# Patient Record
Sex: FEMALE | Race: ASIAN | URBAN - METROPOLITAN AREA
[De-identification: names, ages, dates, MRNs, and addresses within clinical notes are randomized per-mention and may not be internally consistent; named-entity substitution may affect disease eponyms.]

---

## 2017-02-19 ENCOUNTER — OFFICE VISIT (OUTPATIENT)
Dept: URGENT CARE | Facility: URGENT CARE | Age: 42
End: 2017-02-19
Payer: COMMERCIAL

## 2017-02-19 VITALS
DIASTOLIC BLOOD PRESSURE: 70 MMHG | WEIGHT: 98.4 LBS | SYSTOLIC BLOOD PRESSURE: 120 MMHG | BODY MASS INDEX: 19.32 KG/M2 | OXYGEN SATURATION: 100 % | HEART RATE: 67 BPM | HEIGHT: 60 IN | TEMPERATURE: 98 F

## 2017-02-19 DIAGNOSIS — H81.12 BPPV (BENIGN PAROXYSMAL POSITIONAL VERTIGO), LEFT: Primary | ICD-10-CM

## 2017-02-19 PROCEDURE — 99213 OFFICE O/P EST LOW 20 MIN: CPT | Performed by: PHYSICIAN ASSISTANT

## 2017-02-19 RX ORDER — MECLIZINE HYDROCHLORIDE 25 MG/1
25 TABLET ORAL 4 TIMES DAILY PRN
Qty: 30 TABLET | Refills: 1 | Status: SHIPPED | OUTPATIENT
Start: 2017-02-19 | End: 2018-02-05

## 2017-02-19 NOTE — MR AVS SNAPSHOT
After Visit Summary   2/19/2017    Rome Valdovinos    MRN: [id number]           Patient Information     Date Of Birth          1975        Visit Information        Provider Department      2/19/2017 9:45 AM Nasrin Davis PA-C [facility]        Today's Diagnoses     BPPV (benign paroxysmal positional vertigo), left    -  1      Care Instructions      Chóng M?t [Vertigo, Unknown Cause]  Rain phillip ?? ?c tìm th?y phillip não phía leonela màng nh? và rain gi?a. Nó là m?t ph?n c? a grover tâm th?ng b ?ng. B?nh c?a rain phillip gây nên s? chóng m?t - m?t c?m giác griselda l?c v? donna?n ? ?ng. C? m giác nh? là qu ý v? ho? c c?n ph òng ? ang mihaela cu?ng. M? t c?n chóng m ?t có th? khi?n bu?n nôn ? ?t ng?t, ói m?a, ho?c ra mitesh?u m? hôi. S? chóng m?t tr?m tr?ng gây vi?c m? t th?ng b ?ng và có th? làm cho quý v? té ngã. Khi b? chóng m?t, nh?ng c? ? ?ng nh? c? a ? ?u và nh? ng thay ? ? i phillip t? th ? c? a c? th ? th? ?ng s? làm cho các tri?u ch?ng t? h?n .    Nh?ng nguyên nhân c?a ch?ng chóng m?t terri g?m:    Viêm rain phillip    B?nh c?a dây th? n kinh ? ?n rain phillip    Di ? ?ng c?a các h?t can xi (calcium) ? rain phillip    Máu ch?y ? ? n các grover tâm th?ng b ?ng c?a não không ? ? y ? ?  M? t c?n chóng m ?t có th? kéo dài vài giây, vài phút ho?c vài gi?. M?t khi quý v? augusta kh? i c?n chóng m ?t l? n ? ?u, có th? nó không terri gi? tr? l?i n?a. Tian nhiên, ?ôi lúc các tri ?u ch?ng có th? tái phát phillip mitesh?u tu?n ho? c lâu h?n , tùy vào nguyên nhân. Có th? b? ù rain ho?c m?t thính l?c, có th? là t?m th?i ho?c v?nh vi?n.  Ch?m Sóc T?i Elisa:  1) N?u các tri?u ch?ng tr?m tr?ng, hãy ngh? ng?i trên gi? ?ng m?t cách yên t?nh. Thay ? ? i t? th ? t? t?. Th? ?ng s? có m? t t? th ? mà quý v? c?m th?y t?t nh? t, nh? n ?m nghiêng ho?c n?m th? ng l?ng , ? ?u h ? i nâng cortez b?ng g?i.  2) ? ?ng lái xe ho?c ?i ?u khi?n máy móc nguy hi?m phillip vòng m?t tu?n l? leonela khi không còn các tri?u ch?ng, l ? các tri?u ch?ng mihaela  l? i ? ?t ng?t.  3) Dùng thu? c nh? ? ã ?? ?c ch? ? ? nh ? ? gi?m các tri?u ch?ng c?a quý v?. Tr? khi m?t lo?i thu? c khác ? ã ?? ?c ch? ? ? nh ? ? tr? bu?n nôn, ói m?a và chóng m?t, còn không thì quý v? có th? dùng thu?c viên tr? b?nh donna? n ? ?ng bán không c?n toa , nh? meclizine (Bonine, Bonamine, Antivert) ho?c dimenhydrinate (Dramamine).  Ti?p T?c Alden Dõi  v?i bác s? c?a quý v? ho? c alden h? ?ng d?n c?a nhân viên chúng tôi. Cho bác s? bi?t n?u rain quý v? v?n còn ù ho?c n?u thính l?c c?a quý v? không tr? l?i bình th ? ?ng.  N?u x?y ra b?t c? ?i?u nào leonela ?ây hãy L?P T?C ?I?U TR? Y T?:  -- Ch?ng chóng m?t t? h?n và thu ?c ? ã ch? ? ?nh không ki? m soát ?? ?c  -- Ói m?a mitesh?u l?n không gi?m leonela khi dùng thu? c ? ã ch? ? ?nh  -- Vira y?u ho?c ng?t x?u araceli t?ng  -- Nh? c ? ?u tr?m tr?ng ho?c bu?n ng? hay b?i r?i b? t th? ?ng  -- Y?u m?t cánh carley ho?c m?t c?ng chân hay m?t bên m?t  -- Nói ho?c nhìn khó kh ?n    8163-9980 [employer]. [address] [place] [address], [place] [address]. All rights reserved. This information is not intended as a substitute for professional medical care. Always follow your healthcare professional's instructions.              Follow-ups after your visit        Who to contact     If you have questions or need follow up information about today's clinic visit or your schedule please contact [facility] directly at [phone].  Normal or non-critical lab and imaging results will be communicated to you by MyChart, letter or phone within 4 business days after the clinic has received the results. If you do not hear from us within 7 days, please contact the clinic through [employer] or phone. If you have a critical or abnormal lab result, we will notify you by phone as soon as possible.  Submit refill requests through [employer] or call your pharmacy and they will forward the refill request to us. Please allow 3 business days for your refill to be completed.           Additional Information About Your Visit        [employer] Information     [employer] gives you secure access to your electronic health record. If you see a primary care provider, you can also send messages to your care team and make appointments. If you have questions, please call your primary care clinic.  If you do not have a primary care provider, please call [phone] and they will assist you.        Care EveryWhere ID     This is your Care EveryWhere ID. This could be used by other organizations to access your [facility] medical records  [id number]        Your Vitals Were     Pulse Temperature Height Pulse Oximetry BMI (Body Mass Index)       67 98  F (36.7  C) 5' (1.524 m) 100% 19.22 kg/m2        Blood Pressure from Last 3 Encounters:   02/19/17 120/70   08/04/16 100/60   05/09/16 100/64    Weight from Last 3 Encounters:   02/19/17 98 lb 6.4 oz (44.6 kg)   08/04/16 97 lb 9.6 oz (44.3 kg)   05/09/16 96 lb 6.4 oz (43.7 kg)              Today, you had the following     No orders found for display         Today's Medication Changes          These changes are accurate as of: 2/19/17 11:04 AM.  If you have any questions, ask your nurse or doctor.               Start taking these medicines.        Dose/Directions    meclizine 25 MG tablet   Commonly known as:  ANTIVERT   Used for:  BPPV (benign paroxysmal positional vertigo), left   Started by:  Nasrin Davis PA-C        Dose:  25 mg   Take 1 tablet (25 mg) by mouth 4 times daily as needed for dizziness   Quantity:  30 tablet   Refills:  1            Where to get your medicines      These medications were sent to [id number] - [place], [place] - [address] AT [facility] [address] [place] [address]  [address], [place] [address]    Hours:  24-hours Phone:  [phone]     meclizine 25 MG tablet                Primary Care Provider Office Phone # Fax #    Capri Matt [phone]       [place] [address]  [place] [address]        Thank you!     Thank you for choosing [facility]  for your care. Our goal is always to provide you with excellent care. Hearing back from our patients is one way we can continue to improve our services. Please take a few minutes to complete the written survey that you may receive in the mail after your visit with us. Thank you!             Your Updated Medication List - Protect others around you: Learn how to safely use, store and throw away your medicines at www.disposemymeds.org.          This list is accurate as of: 2/19/17 11:04 AM.  Always use your most recent med list.                   Brand Name Dispense Instructions for use    cyclobenzaprine 5 MG tablet    FLEXERIL    15 tablet    Take 1 tablet (5 mg) by mouth At Bedtime       FIRST-MOUTHWASH BLM Susp     237 mL    Swish and swallow 5-10 mLs in mouth every 6 hours as needed       meclizine 25 MG tablet    ANTIVERT    30 tablet    Take 1 tablet (25 mg) by mouth 4 times daily as needed for dizziness       naproxen 500 MG tablet    NAPROSYN    30 tablet    Take 1 tablet (500 mg) by mouth 2 times daily as needed for head ache       SUMAtriptan 50 MG tablet    IMITREX    9 tablet    Take 1 tablet (50 mg) by mouth at onset of headache for migraine May repeat dose in 2 hours.  Do not exceed 200 mg in 24 hours

## 2017-02-19 NOTE — PATIENT INSTRUCTIONS
Chóng M?t [Vertigo, Unknown Cause]  Rain phillip ?? ?c tìm th?y phillip não phía leonela màng nh? và rain gi?a. Nó là m?t ph?n c? a grover tâm th?ng b ?ng. B?nh c?a rain phillip gây nên s? chóng m?t - m?t c?m giác griselda l?c v? donna?n ? ?ng. C? m giác nh? là qu ý v? ho? c c?n ph òng ? ang mihaela cu?ng. M? t c?n chóng m ?t có th? khi?n bu?n nôn ? ?t ng?t, ói m?a, ho?c ra mitesh?u m? hôi. S? chóng m?t tr?m tr?ng gây vi?c m? t th?ng b ?ng và có th? làm cho quý v? té ngã. Khi b? chóng m?t, nh?ng c? ? ?ng nh? c? a ? ?u và nh? ng thay ? ? i phillip t? th ? c? a c? th ? th? ?ng s? làm cho các tri?u ch?ng t? h?n .    Nh?ng nguyên nhân c?a ch?ng chóng m?t terri g?m:    Viêm rain phillip    B?nh c?a dây th? n kinh ? ?n rain phillip    Di ? ?ng c?a các h?t can xi (calcium) ? rain phillip    Máu ch?y ? ? n các grover tâm th?ng b ?ng c?a não không ? ? y ? ?  M? t c?n chóng m ?t có th? kéo dài vài giây, vài phút ho?c vài gi?. M?t khi quý v? augusta kh? i c?n chóng m ?t l? n ? ?u, có th? nó không terri gi? tr? l?i n?a. Tian nhiên, ?ôi lúc các tri ?u ch?ng có th? tái phát phillip mitesh?u tu?n ho? c lâu h?n , tùy vào nguyên nhân. Có th? b? ù rain ho?c m?t thính l?c, có th? là t?m th?i ho?c v?nh vi?n.  Ch?m Sóc T?i Elisa:  1) N?u các tri?u ch?ng tr?m tr?ng, hãy ngh? ng?i trên gi? ?ng m?t cách yên t?nh. Thay ? ? i t? th ? t? t?. Th? ?ng s? có m? t t? th ? mà quý v? c?m th?y t?t nh? t, nh? n ?m nghiêng ho?c n?m th? ng l?ng , ? ?u h ? i nâng cortez b?ng g?i.  2) ? ?ng lái xe ho?c ?i ?u khi?n máy móc nguy hi?m phillip vòng m?t tu?n l? leonela khi không còn các tri?u ch?ng, l ? các tri?u ch?ng mihaeal l? i ? ?t ng?t.  3) Dùng thu? c nh? ? ã ?? ?c ch? ? ? nh ? ? gi?m các tri?u ch?ng c?a quý v?. Tr? khi m?t lo?i thu? c khác ? ã ?? ?c ch? ? ? nh ? ? tr? bu?n nôn, ói m?a và chóng m?t, còn không thì quý v? có th? dùng thu?c viên tr? b?nh donna? n ? ?ng bán không c?n toa , nh? meclizine (Bonine, Bonamine, Antivert) ho?c dimenhydrinate (Dramamine).  Ti?p T?c Alden Dõi  v?i bác s? c?a quý v? ho? c alden h?  ?ng d?n c?a nhân viên chúng tôi. Cho bác s? bi?t n?u rain quý v? v?n còn ù ho?c n?u thính l?c c?a quý v? không tr? l?i bình th ? ?ng.  N?u x?y ra b?t c? ?i?u nào leonela ?ây hãy L?P T?C ?I?U TR? Y T?:  -- Ch?ng chóng m?t t? h?n và thu ?c ? ã ch? ? ?nh không ki? m soát ?? ?c  -- Ói m?a mitesh?u l?n không gi?m leonela khi dùng thu? c ? ã ch? ? ?nh  -- Vira y?u ho?c ng?t x?u araceli t?ng  -- Nh? c ? ?u tr?m tr?ng ho?c bu?n ng? hay b?i r?i b? t th? ?ng  -- Y?u m?t cánh carley ho?c m?t c?ng chân hay m?t bên m?t  -- Nói ho?c nhìn khó kh ?n    [id number] [employer]. [address] [place] [address], [place] [address]. All rights reserved. This information is not intended as a substitute for professional medical care. Always follow your healthcare professional's instructions.

## 2017-02-20 NOTE — PROGRESS NOTES
CHIEF COMPLAINT:   Chief Complaint   Patient presents with     Dizziness     Pt reports dizziness with onset this morning. Pt denies pain.     Urgent Care       HPI: Rome Valdovinos is a 41 year old female who presents to clinic today for evaluation of dizziness. When she woke up this AM and turned over as she was getting out of bed, she started to get dizzy and as she stood up she had trouble keeping her balance. She descibes the dizziness as the room spinning around her and denies having and sense of dizziness or weakness. She admits to a small amount of nausea, but denies lightheadedness or weakness. She states that it is worse when she moves her head to left. She denies recent head trauma, having fallen or passed out, difficulty speaking, CP, palpitations, recent cold, staggering gait, hearing loss, double vision, or heavy bleeding. She has not taken any medication for these symptoms.   Past Medical History   Diagnosis Date     NO ACTIVE PROBLEMS      Past Surgical History   Procedure Laterality Date     C/section, low transverse       x 2     C ligation,fallopian tube w/       with last C section     Social History   Substance Use Topics     Smoking status: Never Smoker     Smokeless tobacco: Never Used     Alcohol use 0.0 oz/week     0 Standard drinks or equivalent per week      Comment: little bit of beer sometimes     Current Outpatient Prescriptions   Medication     meclizine (ANTIVERT) 25 MG tablet     SUMAtriptan (IMITREX) 50 MG tablet     DPH-Lido-AlHydr-MgHydr-Simeth (FIRST-MOUTHWASH BLM) SUSP     cyclobenzaprine (FLEXERIL) 5 MG tablet     naproxen (NAPROSYN) 500 MG tablet     No current facility-administered medications for this visit.      No Known Allergies    ROS:  C: NEGATIVE for fever, chills, change in weight  INTEGUMENTARY/SKIN: NEGATIVE for worrisome rashes, moles or lesions  E/M: NEGATIVE for sore throat, ear or sinus problems  R: NEGATIVE for cough  CV: NEGATIVE for chest pain,  palpitations or peripheral edema  GI: POSITIVE for nausea  MUSCULOSKELETAL: NEGATIVE for significant arthralgias or myalgia  NEURO: POSITIVE for dizziness  ENDOCRINE: NEGATIVE for cold intolerance, HX diabetes and HX thyroid disease  HEME/ALLERGY/IMMUNE: NEGATIVE for swollen nodes      Exam:  /70  Pulse 67  Temp 98  F (36.7  C)  Ht 5' (1.524 m)  Wt 98 lb 6.4 oz (44.6 kg)  SpO2 100%  BMI 19.22 kg/m2  Physical Exam   Constitutional: She is well-developed, well-nourished, and in no distress. Vital signs are normal. She appears not dehydrated.  Non-toxic appearance. She does not have a sickly appearance. No distress.   HENT:   Head: Normocephalic and atraumatic.   Right Ear: Tympanic membrane and ear canal normal. Tympanic membrane is not erythematous and not bulging.   Left Ear: External ear and ear canal normal. Tympanic membrane is not erythematous and not bulging.   Nose: Nose normal. Right sinus exhibits no maxillary sinus tenderness and no frontal sinus tenderness. Left sinus exhibits no maxillary sinus tenderness and no frontal sinus tenderness.   Mouth/Throat: Uvula is midline, oropharynx is clear and moist and mucous membranes are normal. No oropharyngeal exudate.   Eyes: Conjunctivae are normal. Pupils are equal, round, and reactive to light. Right eye exhibits normal extraocular motion and no nystagmus. Left eye exhibits normal extraocular motion and no nystagmus.   Neck: Trachea normal.   Cardiovascular: Normal rate and regular rhythm.    Pulmonary/Chest: Effort normal and breath sounds normal.   Neurological: She is alert. She has normal motor skills and intact cranial nerves. No cranial nerve deficit. Gait normal. GCS score is 15.   Dizziness worsened turning head to left   Skin: Skin is warm, dry and intact. She is not diaphoretic.         ASSESSMENT/PLAN:  1. BPPV (benign paroxysmal positional vertigo), left  For worsening or continuing symptoms follow up with pcp or RTC.  - meclizine (ANTIVERT) 25  MG tablet; Take 1 tablet (25 mg) by mouth 4 times daily as needed for dizziness  Dispense: 30 tablet; Refill: 1    I have discussed any lab or imaging results, the patient's diagnosis, and my plan of treatment with the patient and/or family. Patient is aware to come back in with worsening symptoms or if no relief despite treatment plan.  Patient voiced understanding and had no further questions.   Nasrin Davis PA-C

## 2017-05-10 ENCOUNTER — OFFICE VISIT (OUTPATIENT)
Dept: FAMILY MEDICINE | Facility: CLINIC | Age: 42
End: 2017-05-10
Payer: COMMERCIAL

## 2017-05-10 VITALS
SYSTOLIC BLOOD PRESSURE: 108 MMHG | OXYGEN SATURATION: 100 % | HEIGHT: 60 IN | WEIGHT: 101 LBS | TEMPERATURE: 98.4 F | DIASTOLIC BLOOD PRESSURE: 74 MMHG | HEART RATE: 76 BPM | BODY MASS INDEX: 19.83 KG/M2

## 2017-05-10 DIAGNOSIS — K13.79 SORE MOUTH: ICD-10-CM

## 2017-05-10 DIAGNOSIS — D64.9 LOW HEMOGLOBIN: ICD-10-CM

## 2017-05-10 DIAGNOSIS — G43.009 NONINTRACTABLE MIGRAINE, UNSPECIFIED MIGRAINE TYPE: ICD-10-CM

## 2017-05-10 DIAGNOSIS — Z00.00 ROUTINE GENERAL MEDICAL EXAMINATION AT A HEALTH CARE FACILITY: Primary | ICD-10-CM

## 2017-05-10 DIAGNOSIS — Z91.018 ALLERGY TO FOOD: ICD-10-CM

## 2017-05-10 LAB
ERYTHROCYTE [DISTWIDTH] IN BLOOD BY AUTOMATED COUNT: 11.2 % (ref 10–15)
HCT VFR BLD AUTO: 36.9 % (ref 35–47)
HGB BLD-MCNC: 12.4 G/DL (ref 11.7–15.7)
MCH RBC QN AUTO: 33.5 PG (ref 26.5–33)
MCHC RBC AUTO-ENTMCNC: 33.6 G/DL (ref 31.5–36.5)
MCV RBC AUTO: 100 FL (ref 78–100)
PLATELET # BLD AUTO: 208 10E9/L (ref 150–450)
RBC # BLD AUTO: 3.7 10E12/L (ref 3.8–5.2)
WBC # BLD AUTO: 6.5 10E9/L (ref 4–11)

## 2017-05-10 PROCEDURE — 99213 OFFICE O/P EST LOW 20 MIN: CPT | Mod: 25 | Performed by: FAMILY MEDICINE

## 2017-05-10 PROCEDURE — 86003 ALLG SPEC IGE CRUDE XTRC EA: CPT | Performed by: FAMILY MEDICINE

## 2017-05-10 PROCEDURE — 85027 COMPLETE CBC AUTOMATED: CPT | Performed by: FAMILY MEDICINE

## 2017-05-10 PROCEDURE — 99396 PREV VISIT EST AGE 40-64: CPT | Performed by: FAMILY MEDICINE

## 2017-05-10 PROCEDURE — 36415 COLL VENOUS BLD VENIPUNCTURE: CPT | Performed by: FAMILY MEDICINE

## 2017-05-10 RX ORDER — SUMATRIPTAN 50 MG/1
50 TABLET, FILM COATED ORAL
Qty: 9 TABLET | Refills: 1 | Status: SHIPPED | OUTPATIENT
Start: 2017-05-10

## 2017-05-10 NOTE — NURSING NOTE
Chief Complaint   Patient presents with     Physical       Initial /74  Pulse 76  Temp 98.4  F (36.9  C) (Tympanic)  Ht 5' (1.524 m)  Wt 101 lb (45.8 kg)  LMP 04/17/2017  SpO2 100%  BMI 19.73 kg/m2 Estimated body mass index is 19.73 kg/(m^2) as calculated from the following:    Height as of this encounter: 5' (1.524 m).    Weight as of this encounter: 101 lb (45.8 kg).  Medication Reconciliation: complete

## 2017-05-10 NOTE — PROGRESS NOTES
SUBJECTIVE:     CC: Rome Valdovinos is an 42 year old woman who presents for preventive health visit.     Healthy Habits:    Do you get at least three servings of calcium containing foods daily (dairy, green leafy vegetables, etc.)? yes    Amount of exercise or daily activities, outside of work: 5 days at work  physical demanding duties day(s) per week    Problems taking medications regularly No    Medication side effects: No    Have you had an eye exam in the past two years? yes    Do you see a dentist twice per year? yes    Do you have sleep apnea, excessive snoring or daytime drowsiness?no        PROBLEMS TO ADD ON...  Has problem with recurrent sore in her mouth. She has sore  spots on both sides of mouth along the gums mostly . so she is just concerned . It feels uncomfortable to eat sometimes with hot or spicy food but not all the time. She saw her dentist few weeks ago and she was told her gums were slightly inflamed and may be tight. She just tried taking some antibiotic last week  that she had  from back home,  and did  help some . She is scheduled to see her dentist again tomorrow as well for follow up    She also states that she has possible food allergy and sometimes she gets hives from some food unsure what but she thinks it is possible sea food although she thinks it is not too bad, so she does not eat as much sea food now but still on and off.  she unsure if her mouth sore have  any co relation to food allergies or not    she also had low Hgb, but never came back to do follow up test, so wish to get labs.     Headaches follow up       Duration: has hx of migraine but doing well on current med's     Description  Location: bilateral in the frontal area,    Character: throbbing pain  Frequency:  Once a month, related to menstrual cycle , Imitrex works well       Intensity:  mild, moderate    Accompanying signs and symptoms:    Precipitating or Alleviating factors:  Nausea/vomiting: not any more, after  she takes med's,  HA goes away and she does not feel as sick now,   Family history of migraines: YES, sister            Today's PHQ-2 Score:   PHQ-2 ( 1999 Pfizer) 5/10/2017 8/4/2016   Q1: Little interest or pleasure in doing things 0 0   Q2: Feeling down, depressed or hopeless 0 0   PHQ-2 Score 0 0       Abuse: Current or Past(Physical, Sexual or Emotional)- No  Do you feel safe in your environment - Yes    Social History   Substance Use Topics     Smoking status: Never Smoker     Smokeless tobacco: Never Used     Alcohol use 0.0 oz/week     0 Standard drinks or equivalent per week      Comment: little bit of beer sometimes     The patient does not drink >3 drinks per day nor >7 drinks per week.    Recent Labs   Lab Test  05/09/16   0757  12/13/13   0923   CHOL  223*  259*   HDL  61  50   LDL  149*  189*   TRIG  66  98   CHOLHDLRATIO   --   5.2*   NHDL  162*   --        Reviewed orders with patient.  Reviewed health maintenance and updated orders accordingly - Yes    Mammo Decision Support:  Patient under age 50, mutual decision reflected in health maintenance.      Pertinent mammograms are reviewed under the imaging tab.  History of abnormal Pap smear: NO - age 30- 65 PAP every 3 years recommended    Reviewed and updated as needed this visit by clinical staff  Meds         Reviewed and updated as needed this visit by Provider        Past Medical History:   Diagnosis Date     NO ACTIVE PROBLEMS         ROS:  C: NEGATIVE for fever, chills, change in weight  I: NEGATIVE for worrisome rashes, moles or lesions  E: NEGATIVE for vision changes or irritation  ENT: POSITIVE for sore in mouth as per HPI  and NEGATIVE for bleeding gums, ear pain , sneezing, sore throat and swollen glands currently   R: NEGATIVE for significant cough or SOB  B: NEGATIVE for masses, tenderness or discharge  CV: NEGATIVE for chest pain, palpitations or peripheral edema  GI: NEGATIVE for nausea, abdominal pain, heartburn, or change in bowel  habits  : NEGATIVE for unusual urinary or vaginal symptoms. Periods are regular.  M: NEGATIVE for significant arthralgias or myalgia  N: NEGATIVE for weakness, dizziness or paresthesias  E: NEGATIVE for temperature intolerance, skin/hair changes  H: NEGATIVE for bleeding problems  P: NEGATIVE for changes in mood or affect      OBJECTIVE:     There were no vitals taken for this visit.  EXAM:  GENERAL: healthy, alert and no distress  EYES: Eyes grossly normal to inspection, PERRL and conjunctivae and sclerae normal  HENT: ear canals and TM's normal, nose and mouth without ulcers or lesions except has minimal erythema along lower gingival lining on back of his mouth on each side and reports minimal tenderness and that's the are that bothers her most   NECK: no adenopathy, no asymmetry, masses, or scars and thyroid normal to palpation  RESP: lungs clear to auscultation - no rales, rhonchi or wheezes  BREAST: normal without masses, tenderness or nipple discharge and no palpable axillary masses or adenopathy  CV: regular rate and rhythm, normal S1 S2, no S3 or S4, no murmur, click or rub, no peripheral edema and peripheral pulses strong  ABDOMEN: soft, nontender, no hepatosplenomegaly, no masses and bowel sounds normal   (female): deferred  RECTAL: deferred  MS: no gross musculoskeletal defects noted, no edema  SKIN: no suspicious lesions or rashes  NEURO: Normal strength and tone, mentation intact and speech normal  PSYCH: mentation appears normal, affect normal/bright    ASSESSMENT/PLAN:     (Z00.00) Routine general medical examination at a health care facility  (primary encounter diagnosis)  Comment:   Plan: SUMAtriptan (IMITREX) 50 MG tablet, Glucose,         Lipid panel reflex to direct LDL            (Z91.018) Allergy to food  Comment:   Plan: Allergy adult food panel          (G43.009) Nonintractable migraine, unspecified migraine type  Comment: mostly menstrual migraine   Plan: stable on Imitrex     (K13.79)  Sore mouth  Comment: suspect possible gingivitis related , or possible food allergies   Plan: Allergy adult food panel    (D64.9) Low hemoglobin  Comment:   Plan: CBC with platelets      COUNSELING:   Reviewed preventive health counseling, as reflected in patient instructions       Regular exercise       Healthy diet/nutrition       Vision screening         reports that she has never smoked. She has never used smokeless tobacco.    Estimated body mass index is 19.22 kg/(m^2) as calculated from the following:    Height as of 2/19/17: 5' (1.524 m).    Weight as of 2/19/17: 98 lb 6.4 oz (44.6 kg).   Weight management plan: Discussed healthy diet and exercise guidelines and patient will follow up in 12 months in clinic to re-evaluate.      HCM issues discussed. Diet/ exercise discussed. Check labs , call patiens with results. follow up as needed.       Counseling Resources:  ATP IV Guidelines  Pooled Cohorts Equation Calculator  Breast Cancer Risk Calculator  FRAX Risk Assessment  ICSI Preventive Guidelines  Dietary Guidelines for Americans, 2010  USDA's MyPlate  ASA Prophylaxis  Lung CA Screening    Capri Matt MD  [facility]

## 2017-05-10 NOTE — MR AVS SNAPSHOT
After Visit Summary   5/10/2017    Rome Valdovinos    MRN: [id number]           Patient Information     Date Of Birth          1975        Visit Information        Provider Department      5/10/2017 4:00 PM Capri Matt MD [facility] [place]        Today's Diagnoses     Routine general medical examination at a health care facility    -  1    Allergy to food        Nonintractable migraine, unspecified migraine type        Sore mouth        Low hemoglobin          Care Instructions      Preventive Health Recommendations  Female Ages 40 to 49    Yearly exam:     See your health care provider every year in order to  1. Review health changes.   2. Discuss preventive care.    3. Review your medicines if your doctor prescribed any.      Get a Pap test every three years (unless you have an abnormal result and your provider advises testing more often).      If you get Pap tests with HPV test, you only need to test every 5 years, unless you have an abnormal result. You do not need a Pap test if your uterus was removed (hysterectomy) and you have not had cancer.      You should be tested each year for STDs (sexually transmitted diseases), if you're at risk.       Ask your doctor if you should have a mammogram.      Have a colonoscopy (test for colon cancer) if someone in your family has had colon cancer or polyps before age 50.       Have a cholesterol test every 5 years.       Have a diabetes test (fasting glucose) after age 45. If you are at risk for diabetes, you should have this test every 3 years.    Shots: Get a flu shot each year. Get a tetanus shot every 10 years.     Nutrition:     Eat at least 5 servings of fruits and vegetables each day.    Eat whole-grain bread, whole-wheat pasta and brown rice instead of white grains and rice.    Talk to your provider about Calcium and Vitamin D.     Lifestyle    Exercise at least 150 minutes a week (an average of 30 minutes a day, 5 days a  week). This will help you control your weight and prevent disease.    Limit alcohol to one drink per day.    No smoking.     Wear sunscreen to prevent skin cancer.    See your dentist every six months for an exam and cleaning.        Follow-ups after your visit        Future tests that were ordered for you today     Open Future Orders        Priority Expected Expires Ordered    Glucose Routine  10/10/2017 5/10/2017    Lipid panel reflex to direct LDL Routine  10/10/2017 5/10/2017            Who to contact     If you have questions or need follow up information about today's clinic visit or your schedule please contact [facility] [place] directly at [phone].  Normal or non-critical lab and imaging results will be communicated to you by [employer], letter or phone within 4 business days after the clinic has received the results. If you do not hear from us within 7 days, please contact the clinic through [employer] or phone. If you have a critical or abnormal lab result, we will notify you by phone as soon as possible.  Submit refill requests through [employer] ([employer] or call your pharmacy and they will forward the refill request to us. Please allow 3 business days for your refill to be completed.          Additional Information About Your Visit        [employer] Information     [employer] gives you secure access to your electronic health record. If you see a primary care provider, you can also send messages to your care team and make appointments. If you have questions, please call your primary care clinic.  If you do not have a primary care provider, please call [phone] and they will assist you.        Care EveryWhere ID     This is your Care EveryWhere ID. This could be used by other organizations to access your [facility] medical records  [id number]        Your Vitals Were     Pulse Temperature Height Last Period Pulse Oximetry BMI (Body Mass Index)    76 98.4  F (36.9  C) (Tympanic) 5' (1.524 m) 04/17/2017 100%  19.73 kg/m2       Blood Pressure from Last 3 Encounters:   05/10/17 108/74   02/19/17 120/70   08/04/16 100/60    Weight from Last 3 Encounters:   05/10/17 101 lb (45.8 kg)   02/19/17 98 lb 6.4 oz (44.6 kg)   08/04/16 97 lb 9.6 oz (44.3 kg)              We Performed the Following     Allergy adult food panel     CBC with platelets          Where to get your medicines      These medications were sent to [id number] - [place], [place] - [address] AT [facility] [place] [address]  [address], [place] [address]    Hours:  24-hours Phone:  [phone]     SUMAtriptan 50 MG tablet          Primary Care Provider Office Phone # Fax #    Capri Matt [phone]       [address]  [place] [address]        Thank you!     Thank you for choosing [facility]  for your care. Our goal is always to provide you with excellent care. Hearing back from our patients is one way we can continue to improve our services. Please take a few minutes to complete the written survey that you may receive in the mail after your visit with us. Thank you!             Your Updated Medication List - Protect others around you: Learn how to safely use, store and throw away your medicines at www.disposemymeds.org.          This list is accurate as of: 5/10/17  4:51 PM.  Always use your most recent med list.                   Brand Name Dispense Instructions for use    lidocaine visc 2% & diphenhydramine 12.5mg/5mL & maalox/mylanta w/simethicone (1:1:1 v/v/v) Susp compounding kit     237 mL    Swish and swallow 5-10 mLs in mouth every 6 hours as needed       meclizine 25 MG tablet    ANTIVERT    30 tablet    Take 1 tablet (25 mg) by mouth 4 times daily as needed for dizziness       naproxen 500 MG tablet    NAPROSYN    30 tablet    Take 1 tablet (500 mg) by mouth 2 times daily as needed for head ache       SUMAtriptan 50 MG tablet    IMITREX     9 tablet    Take 1 tablet (50 mg) by mouth at onset of headache for migraine May repeat dose in 2 hours.  Do not exceed 200 mg in 24 hours

## 2017-05-10 NOTE — LETTER
[facility] [place]  [address] [place] [id number]  [phone]        October 18, 2017    Rome Valdovinos  [address] [facility] [address]              Dear Rome Valdovinos    This is to remind you that your fasting lab work is due.    You may call our office at [phone] to schedule an appointment.    Please disregard this notice if you have already had your labs drawn or made an appointment.        Sincerely,        Capri Matt M.D.

## 2017-05-12 LAB
CLAM IGE QN: NORMAL KU(A)/L
CODFISH IGE QN: NORMAL KU(A)/L
CORN IGE QN: NORMAL KU(A)/L
COW MILK IGE QN: NORMAL KU/L
EGG WHITE IGE QN: NORMAL KU(A)/L
PEANUT IGE QN: NORMAL KU(A)/L
SCALLOP IGE QN: NORMAL KU(A)/L
SHRIMP IGE QN: NORMAL KU(A)/L
SOYBEAN IGE QN: NORMAL KU(A)/L
WALNUT IGE QN: NORMAL KU(A)/L
WHEAT IGE QN: NORMAL KU(A)/L

## 2017-07-13 ENCOUNTER — TELEPHONE (OUTPATIENT)
Dept: FAMILY MEDICINE | Facility: CLINIC | Age: 42
End: 2017-07-13

## 2017-07-13 DIAGNOSIS — K13.70 ORAL MUCOSAL LESION: Primary | ICD-10-CM

## 2017-07-13 NOTE — TELEPHONE ENCOUNTER
Dr Matt can you please do a referral to the [facility] dental clinic. Patient is going to be going there to get her lesion biopsied. Patient did see a dentist and this is what the dentist would like her to do.  Please advise. Pending referral.  I filled out as much as I could but didn't know where it's located in her mouth. Can you finish and sign. Thanks    Yvette Lundy  ReferralCoordinator

## 2017-08-07 ENCOUNTER — OFFICE VISIT (OUTPATIENT)
Dept: FAMILY MEDICINE | Facility: CLINIC | Age: 42
End: 2017-08-07
Payer: COMMERCIAL

## 2017-08-07 VITALS
SYSTOLIC BLOOD PRESSURE: 123 MMHG | HEIGHT: 60 IN | DIASTOLIC BLOOD PRESSURE: 80 MMHG | BODY MASS INDEX: 19.83 KG/M2 | TEMPERATURE: 98.6 F | HEART RATE: 59 BPM | WEIGHT: 101 LBS | OXYGEN SATURATION: 100 %

## 2017-08-07 DIAGNOSIS — R21 RASH AND NONSPECIFIC SKIN ERUPTION: Primary | ICD-10-CM

## 2017-08-07 PROCEDURE — 36415 COLL VENOUS BLD VENIPUNCTURE: CPT | Performed by: FAMILY MEDICINE

## 2017-08-07 PROCEDURE — 99213 OFFICE O/P EST LOW 20 MIN: CPT | Performed by: FAMILY MEDICINE

## 2017-08-07 PROCEDURE — 87529 HSV DNA AMP PROBE: CPT | Performed by: FAMILY MEDICINE

## 2017-08-07 PROCEDURE — 87529 HSV DNA AMP PROBE: CPT | Mod: 59 | Performed by: FAMILY MEDICINE

## 2017-08-07 PROCEDURE — 87798 DETECT AGENT NOS DNA AMP: CPT | Performed by: FAMILY MEDICINE

## 2017-08-07 RX ORDER — VALACYCLOVIR HYDROCHLORIDE 1 G/1
1000 TABLET, FILM COATED ORAL 3 TIMES DAILY
Qty: 21 TABLET | Refills: 0 | Status: SHIPPED | OUTPATIENT
Start: 2017-08-07 | End: 2018-02-05

## 2017-08-07 NOTE — PROGRESS NOTES
SUBJECTIVE:                                                    Rome Valdovinos is a 42 year old female who presents to clinic today for the following health issues:      Rash      Duration: 1 + week     Description  Location: left leg , leg feels achy and rash spread down   Itching: moderate  Painful: Moderate/severe   Red/blister: yes    pain has   subsided a little  Spreading     Intensity:  moderate    Accompanying signs and symptoms: no sx of fever chills or body aches     History (similar episodes/previous evaluation): None    Precipitating or alleviating factors:  New exposures:  None  Recent travel: no      Therapies tried and outcome: hydrocortisone cream -  not effective and             Problem list and histories reviewed & adjusted, as indicated.  Additional history: as documented    Patient Active Problem List   Diagnosis     Headache     Constipation     CARDIOVASCULAR SCREENING; LDL GOAL LESS THAN 160     Low hemoglobin     Nonintractable migraine, unspecified migraine type     Past Surgical History:   Procedure Laterality Date     C LIGATION,FALLOPIAN TUBE W/      with last C section     C/SECTION, LOW TRANSVERSE      x 2       Social History   Substance Use Topics     Smoking status: Never Smoker     Smokeless tobacco: Never Used     Alcohol use 0.0 oz/week     0 Standard drinks or equivalent per week      Comment: little bit of beer sometimes     Family History   Problem Relation Age of Onset     DIABETES Mother      Hypertension Father      C.A.D. No family hx of      Breast Cancer No family hx of      Cancer - colorectal No family hx of              Reviewed and updated as needed this visit by clinical staff     Reviewed and updated as needed this visit by Provider         ROS:  Constitutional, HEENT, cardiovascular, pulmonary, GI, , musculoskeletal, neuro, skin, endocrine and psych systems are negative, except as otherwise noted.      OBJECTIVE:   /80  Pulse 59  Temp 98.6  F (37   C) (Tympanic)  Ht 5' (1.524 m)  Wt 101 lb (45.8 kg)  LMP 07/24/2017 (Approximate)  SpO2 100%  BMI 19.73 kg/m2  Body mass index is 19.73 kg/(m^2).  GENERAL: healthy, alert and no distress  RESP: lungs clear to auscultation - no rales, rhonchi or wheezes  CV: regular rate and rhythm, normal S1 S2, no S3 or S4,   ABDOMEN: soft, nontender, no hepatosplenomegaly, no masses and bowel sounds normal  MS: no edema  SKIN: left leg medial thigh area has rash , 3 patchy areas of erythematous clusters , it is slightly dry now so no obvious blister or drainage at this time  ,   NEURO: Normal strength and tone, mentation intact and speech normal        ASSESSMENT/PLAN:     (R21) Rash and nonspecific skin eruption  (primary encounter diagnosis)  Comment:   Plan: HSV 1 and 2 DNA by PCR, valACYclovir (VALTREX)         1000 mg tablet, Varicella Zoster DNA PCR CSF or        Skin Swab          suspect herpes, likely shingles. Start  valtrex also took swab to confirm. Skin Cares and symptomatic treatment discussed follow up if problem       Patient expressed understanding and agreement with treatment plan. All patient's questions were answered, will let me know if has more later.  Medications: Rx's: Reviewed the potential side effects/complications of medications prescribed.       Capri Matt MD  [facility]

## 2017-08-07 NOTE — PATIENT INSTRUCTIONS
Shingles (Herpes Zoster)     Talk to your healthcare provider about the shingles vaccine.     Shingles is also called herpes zoster. It is a painful skin rash caused by the herpes zoster virus. This is the same virus that causes chickenpox. After a person has chickenpox, the virus remains inactive in the nerve cells. Years later, the virus can become active again and travel to the skin. Most people have shingles only once, but it is possible to have it more than once.  What are the risk factors for shingles?  Anyone who has ever had chickenpox can develop shingles. But your risk is greater if you:    Are 50 years of age or older    Have an illness that weakens your immune system, such as HIV/AIDS    Have cancer, especially Hodgkin disease or lymphoma    Take medicines that weaken your immune system  What are the symptoms of shingles?    The first sign of shingles is usually pain, burning, tingling, or itching on one part of your face or body. You may also feel as if you have the flu, with fever and chills.    A red rash with small blisters appears within a few days. The rash may appear as follows:     The blisters can occur anywhere, but they re most common on the back, chest, or abdomen.    They usually appear on only one side of the body, spreading along the nerve pathway where the virus was inactive.     The rash can also form around an eye, along one side of the face or neck, or in the mouth.    In a few people, usually those with weakened immune systems, shingles appear on more than one part of the body at once.    After a few days, the blisters become dry and form a crust. The crust falls off in days to weeks. The blisters generally do not leave scars.  How is shingles treated?  For most people, shingles heals on its own in a few weeks. But treatment is recommended to help relieve pain, speed healing, and reduce the risk of complications. Antiviral medicines are prescribed within the first 72 hours of the  appearance of the rash. To lessen symptoms:    Apply ice packs (wrapped in a thin towel) or cool compresses, or soak in a cool bath.    Use calamine lotion to calm itchy skin.    Ask your healthcare provider about over-the-counter pain relievers. If your pain is severe, your healthcare provider may prescribe stronger pain medicines.  What are the complications of shingles?  Shingles often goes away with no lasting effects. But some people have serious problems long after the blisters have healed:    Postherpetic neuralgia. This is the most common complication. It is severe nerve pain at the place where the rash used to be. It can last for months, or even years after you have had shingles. Medicines can be prescribed to help relieve the pain and improve quality of life.    Bacterial infection. Shingles blisters may become infected with bacteria. Antibiotic medicine is used to treat the infection.    Eye problems. A person with shingles on the face should see his or her healthcare provider right away. Shingles can cause serious problems with vision, and even blindness.  Very rarely shingles can also lead to pneumonia, hearing problems, brain inflammation, or even death.   When to seek medical care  Contact your healthcare provider if you experience any of the following:    Symptoms that don t go away with treatment    A rash or blisters near your eye    Increased drainage, fever, or rash after treatment, or severe pain that doesn t go away   How can shingles be prevented?  You can only get shingles if you have had chicken pox in the past. Those who have never had chickenpox can get the virus from you. Although instead of developing shingles, the person may get chickenpox. Until your blisters form scabs, avoid contact with others, especially the following:    Pregnant women who have never had chickenpox or the vaccine    Infants who were born early (prematurely) or who had low weight at birth    People with weak immune  system (for example, people receiving chemotherapy for cancer, people who have had organ transplants, or people with HIV infections)     The shingles vaccine  If you re 60 years of age or older, ask your healthcare provider if you should receive the shingles vaccine. The vaccine makes it less likely that you will develop shingles. If you do develop shingles, your symptoms will likely be milder than if you hadn t been vaccinated. Note: Although the vaccine is licensed for people 50 years of age or older, the CDC does not recommend the vaccine for those who are 50 to 59 years old.   Date Last Reviewed: 10/1/2016    0967-7103 [employer]. [address] [place] [address], [place] [address]. All rights reserved. This information is not intended as a substitute for professional medical care. Always follow your healthcare professional's instructions.        Shingles (Herpes Zoster)     Talk to your healthcare provider about the shingles vaccine.     Shingles is also called herpes zoster. It is a painful skin rash caused by the herpes zoster virus. This is the same virus that causes chickenpox. After a person has chickenpox, the virus remains inactive in the nerve cells. Years later, the virus can become active again and travel to the skin. Most people have shingles only once, but it is possible to have it more than once.  What are the risk factors for shingles?  Anyone who has ever had chickenpox can develop shingles. But your risk is greater if you:    Are 50 years of age or older    Have an illness that weakens your immune system, such as HIV/AIDS    Have cancer, especially Hodgkin disease or lymphoma    Take medicines that weaken your immune system  What are the symptoms of shingles?    The first sign of shingles is usually pain, burning, tingling, or itching on one part of your face or body. You may also feel as if you have the flu, with fever and chills.    A red rash with small blisters appears within a few  days. The rash may appear as follows:     The blisters can occur anywhere, but they re most common on the back, chest, or abdomen.    They usually appear on only one side of the body, spreading along the nerve pathway where the virus was inactive.     The rash can also form around an eye, along one side of the face or neck, or in the mouth.    In a few people, usually those with weakened immune systems, shingles appear on more than one part of the body at once.    After a few days, the blisters become dry and form a crust. The crust falls off in days to weeks. The blisters generally do not leave scars.  How is shingles treated?  For most people, shingles heals on its own in a few weeks. But treatment is recommended to help relieve pain, speed healing, and reduce the risk of complications. Antiviral medicines are prescribed within the first 72 hours of the appearance of the rash. To lessen symptoms:    Apply ice packs (wrapped in a thin towel) or cool compresses, or soak in a cool bath.    Use calamine lotion to calm itchy skin.    Ask your healthcare provider about over-the-counter pain relievers. If your pain is severe, your healthcare provider may prescribe stronger pain medicines.  What are the complications of shingles?  Shingles often goes away with no lasting effects. But some people have serious problems long after the blisters have healed:    Postherpetic neuralgia. This is the most common complication. It is severe nerve pain at the place where the rash used to be. It can last for months, or even years after you have had shingles. Medicines can be prescribed to help relieve the pain and improve quality of life.    Bacterial infection. Shingles blisters may become infected with bacteria. Antibiotic medicine is used to treat the infection.    Eye problems. A person with shingles on the face should see his or her healthcare provider right away. Shingles can cause serious problems with vision, and even  blindness.  Very rarely shingles can also lead to pneumonia, hearing problems, brain inflammation, or even death.   When to seek medical care  Contact your healthcare provider if you experience any of the following:    Symptoms that don t go away with treatment    A rash or blisters near your eye    Increased drainage, fever, or rash after treatment, or severe pain that doesn t go away   How can shingles be prevented?  You can only get shingles if you have had chicken pox in the past. Those who have never had chickenpox can get the virus from you. Although instead of developing shingles, the person may get chickenpox. Until your blisters form scabs, avoid contact with others, especially the following:    Pregnant women who have never had chickenpox or the vaccine    Infants who were born early (prematurely) or who had low weight at birth    People with weak immune system (for example, people receiving chemotherapy for cancer, people who have had organ transplants, or people with HIV infections)     The shingles vaccine  If you re 60 years of age or older, ask your healthcare provider if you should receive the shingles vaccine. The vaccine makes it less likely that you will develop shingles. If you do develop shingles, your symptoms will likely be milder than if you hadn t been vaccinated. Note: Although the vaccine is licensed for people 50 years of age or older, the CDC does not recommend the vaccine for those who are 50 to 59 years old.   Date Last Reviewed: 10/1/2016    2631-9935 [employer]. [address] [place] [address], [place] [address]. All rights reserved. This information is not intended as a substitute for professional medical care. Always follow your healthcare professional's instructions.

## 2017-08-07 NOTE — MR AVS SNAPSHOT
After Visit Summary   8/7/2017    Rome Valdovinos    MRN: [id number]           Patient Information     Date Of Birth          1975        Visit Information        Provider Department      8/7/2017 4:00 PM Capri Matt [place] [facility] [place]        Today's Diagnoses     Rash and nonspecific skin eruption    -  1      Care Instructions      Shingles (Herpes Zoster)     Talk to your healthcare provider about the shingles vaccine.     Shingles is also called herpes zoster. It is a painful skin rash caused by the herpes zoster virus. This is the same virus that causes chickenpox. After a person has chickenpox, the virus remains inactive in the nerve cells. Years later, the virus can become active again and travel to the skin. Most people have shingles only once, but it is possible to have it more than once.  What are the risk factors for shingles?  Anyone who has ever had chickenpox can develop shingles. But your risk is greater if you:    Are 50 years of age or older    Have an illness that weakens your immune system, such as HIV/AIDS    Have cancer, especially Hodgkin disease or lymphoma    Take medicines that weaken your immune system  What are the symptoms of shingles?    The first sign of shingles is usually pain, burning, tingling, or itching on one part of your face or body. You may also feel as if you have the flu, with fever and chills.    A red rash with small blisters appears within a few days. The rash may appear as follows:     The blisters can occur anywhere, but they re most common on the back, chest, or abdomen.    They usually appear on only one side of the body, spreading along the nerve pathway where the virus was inactive.     The rash can also form around an eye, along one side of the face or neck, or in the mouth.    In a few people, usually those with weakened immune systems, shingles appear on more than one part of the body at once.    After a few days, the  blisters become dry and form a crust. The crust falls off in days to weeks. The blisters generally do not leave scars.  How is shingles treated?  For most people, shingles heals on its own in a few weeks. But treatment is recommended to help relieve pain, speed healing, and reduce the risk of complications. Antiviral medicines are prescribed within the first 72 hours of the appearance of the rash. To lessen symptoms:    Apply ice packs (wrapped in a thin towel) or cool compresses, or soak in a cool bath.    Use calamine lotion to calm itchy skin.    Ask your healthcare provider about over-the-counter pain relievers. If your pain is severe, your healthcare provider may prescribe stronger pain medicines.  What are the complications of shingles?  Shingles often goes away with no lasting effects. But some people have serious problems long after the blisters have healed:    Postherpetic neuralgia. This is the most common complication. It is severe nerve pain at the place where the rash used to be. It can last for months, or even years after you have had shingles. Medicines can be prescribed to help relieve the pain and improve quality of life.    Bacterial infection. Shingles blisters may become infected with bacteria. Antibiotic medicine is used to treat the infection.    Eye problems. A person with shingles on the face should see his or her healthcare provider right away. Shingles can cause serious problems with vision, and even blindness.  Very rarely shingles can also lead to pneumonia, hearing problems, brain inflammation, or even death.   When to seek medical care  Contact your healthcare provider if you experience any of the following:    Symptoms that don t go away with treatment    A rash or blisters near your eye    Increased drainage, fever, or rash after treatment, or severe pain that doesn t go away   How can shingles be prevented?  You can only get shingles if you have had chicken pox in the past. Those who  have never had chickenpox can get the virus from you. Although instead of developing shingles, the person may get chickenpox. Until your blisters form scabs, avoid contact with others, especially the following:    Pregnant women who have never had chickenpox or the vaccine    Infants who were born early (prematurely) or who had low weight at birth    People with weak immune system (for example, people receiving chemotherapy for cancer, people who have had organ transplants, or people with HIV infections)     The shingles vaccine  If you re 60 years of age or older, ask your healthcare provider if you should receive the shingles vaccine. The vaccine makes it less likely that you will develop shingles. If you do develop shingles, your symptoms will likely be milder than if you hadn t been vaccinated. Note: Although the vaccine is licensed for people 50 years of age or older, the CDC does not recommend the vaccine for those who are 50 to 59 years old.   Date Last Reviewed: 10/1/2016    5419-4681 [employer]. [address] [place] [address]. All rights reserved. This information is not intended as a substitute for professional medical care. Always follow your healthcare professional's instructions.        Shingles (Herpes Zoster)     Talk to your healthcare provider about the shingles vaccine.     Shingles is also called herpes zoster. It is a painful skin rash caused by the herpes zoster virus. This is the same virus that causes chickenpox. After a person has chickenpox, the virus remains inactive in the nerve cells. Years later, the virus can become active again and travel to the skin. Most people have shingles only once, but it is possible to have it more than once.  What are the risk factors for shingles?  Anyone who has ever had chickenpox can develop shingles. But your risk is greater if you:    Are 50 years of age or older    Have an illness that weakens your immune system, such as  HIV/AIDS    Have cancer, especially Hodgkin disease or lymphoma    Take medicines that weaken your immune system  What are the symptoms of shingles?    The first sign of shingles is usually pain, burning, tingling, or itching on one part of your face or body. You may also feel as if you have the flu, with fever and chills.    A red rash with small blisters appears within a few days. The rash may appear as follows:     The blisters can occur anywhere, but they re most common on the back, chest, or abdomen.    They usually appear on only one side of the body, spreading along the nerve pathway where the virus was inactive.     The rash can also form around an eye, along one side of the face or neck, or in the mouth.    In a few people, usually those with weakened immune systems, shingles appear on more than one part of the body at once.    After a few days, the blisters become dry and form a crust. The crust falls off in days to weeks. The blisters generally do not leave scars.  How is shingles treated?  For most people, shingles heals on its own in a few weeks. But treatment is recommended to help relieve pain, speed healing, and reduce the risk of complications. Antiviral medicines are prescribed within the first 72 hours of the appearance of the rash. To lessen symptoms:    Apply ice packs (wrapped in a thin towel) or cool compresses, or soak in a cool bath.    Use calamine lotion to calm itchy skin.    Ask your healthcare provider about over-the-counter pain relievers. If your pain is severe, your healthcare provider may prescribe stronger pain medicines.  What are the complications of shingles?  Shingles often goes away with no lasting effects. But some people have serious problems long after the blisters have healed:    Postherpetic neuralgia. This is the most common complication. It is severe nerve pain at the place where the rash used to be. It can last for months, or even years after you have had shingles.  Medicines can be prescribed to help relieve the pain and improve quality of life.    Bacterial infection. Shingles blisters may become infected with bacteria. Antibiotic medicine is used to treat the infection.    Eye problems. A person with shingles on the face should see his or her healthcare provider right away. Shingles can cause serious problems with vision, and even blindness.  Very rarely shingles can also lead to pneumonia, hearing problems, brain inflammation, or even death.   When to seek medical care  Contact your healthcare provider if you experience any of the following:    Symptoms that don t go away with treatment    A rash or blisters near your eye    Increased drainage, fever, or rash after treatment, or severe pain that doesn t go away   How can shingles be prevented?  You can only get shingles if you have had chicken pox in the past. Those who have never had chickenpox can get the virus from you. Although instead of developing shingles, the person may get chickenpox. Until your blisters form scabs, avoid contact with others, especially the following:    Pregnant women who have never had chickenpox or the vaccine    Infants who were born early (prematurely) or who had low weight at birth    People with weak immune system (for example, people receiving chemotherapy for cancer, people who have had organ transplants, or people with HIV infections)     The shingles vaccine  If you re 60 years of age or older, ask your healthcare provider if you should receive the shingles vaccine. The vaccine makes it less likely that you will develop shingles. If you do develop shingles, your symptoms will likely be milder than if you hadn t been vaccinated. Note: Although the vaccine is licensed for people 50 years of age or older, the CDC does not recommend the vaccine for those who are 50 to 59 years old.   Date Last Reviewed: 10/1/2016    4708-1235 [employer]. [address] [place] [address], [place]  [address]. All rights reserved. This information is not intended as a substitute for professional medical care. Always follow your healthcare professional's instructions.                Follow-ups after your visit        Who to contact     If you have questions or need follow up information about today's clinic visit or your schedule please contact [facility] [place] directly at [phone].  Normal or non-critical lab and imaging results will be communicated to you by MyChart, letter or phone within 4 business days after the clinic has received the results. If you do not hear from us within 7 days, please contact the clinic through [employer] or phone. If you have a critical or abnormal lab result, we will notify you by phone as soon as possible.  Submit refill requests through [employer] or call your pharmacy and they will forward the refill request to us. Please allow 3 business days for your refill to be completed.          Additional Information About Your Visit        [employer] Information     [employer] gives you secure access to your electronic health record. If you see a primary care provider, you can also send messages to your care team and make appointments. If you have questions, please call your primary care clinic.  If you do not have a primary care provider, please call [phone] and they will assist you.        Care EveryWhere ID     This is your Care EveryWhere ID. This could be used by other organizations to access your [facility] medical records  [id number]        Your Vitals Were     Pulse Temperature Height Last Period Pulse Oximetry BMI (Body Mass Index)    59 98.6  F (37  C) (Tympanic) 5' (1.524 m) 07/24/2017 (Approximate) 100% 19.73 kg/m2       Blood Pressure from Last 3 Encounters:   08/07/17 123/80   05/10/17 108/74   02/19/17 120/70    Weight from Last 3 Encounters:   08/07/17 101 lb (45.8 kg)   05/10/17 101 lb (45.8 kg)   02/19/17 98 lb 6.4 oz (44.6 kg)              We Performed the  Following     1st  Administration  [20606]     HSV 1 and 2 DNA by PCR     TDAP VACCINE (ADACEL) [43196.002]          Today's Medication Changes          These changes are accurate as of: 8/7/17  4:22 PM.  If you have any questions, ask your nurse or doctor.               Start taking these medicines.        Dose/Directions    valACYclovir 1000 mg tablet   Commonly known as:  VALTREX   Used for:  Rash and nonspecific skin eruption   Started by:  Capri Matt MD        Dose:  1000 mg   Take 1 tablet (1,000 mg) by mouth 3 times daily   Quantity:  21 tablet   Refills:  0            Where to get your medicines      These medications were sent to [id number] - [place], [place] - [address] AT [facility] [place] [address] [facility] [address]  [address], [place] [address]    Hours:  24-hours Phone:  [phone]     valACYclovir 1000 mg tablet                Primary Care Provider Office Phone # Fax #    Capri Matt [phone]       [facility] [address] [place] [address] [address]  [place] [address]        Equal Access to Services     [facility] AH: Hadii aad ku hadasho Soomaali, waaxda luqadaha, qaybta kaalmada adeegyada, waxay idiin hayaan adepascual kharaadam lahossein ah. So [facility] [phone].    ATENCIÓN: Si habla español, tiene a vyas disposición servicios gratuitos de asistencia lingüística. Llame al [phone].    We comply with applicable federal civil rights laws and [place] laws. We do not discriminate on the basis of race, color, national origin, age, disability sex, sexual orientation or gender identity.            Thank you!     Thank you for choosing [facility] [place]  for your care. Our goal is always to provide you with excellent care. Hearing back from our patients is one way we can continue to improve our services. Please take a few minutes to complete the written survey that you may receive in the mail after your visit with us. Thank you!              Your Updated Medication List - Protect others around you: Learn how to safely use, store and throw away your medicines at www.disposemymeds.org.          This list is accurate as of: 8/7/17  4:22 PM.  Always use your most recent med list.                   Brand Name Dispense Instructions for use Diagnosis    meclizine 25 MG tablet    ANTIVERT    30 tablet    Take 1 tablet (25 mg) by mouth 4 times daily as needed for dizziness    BPPV (benign paroxysmal positional vertigo), left       naproxen 500 MG tablet    NAPROSYN    30 tablet    Take 1 tablet (500 mg) by mouth 2 times daily as needed for head ache    Headache(784.0)       SUMAtriptan 50 MG tablet    IMITREX    9 tablet    Take 1 tablet (50 mg) by mouth at onset of headache for migraine May repeat dose in 2 hours.  Do not exceed 200 mg in 24 hours    Routine general medical examination at a health care facility       valACYclovir 1000 mg tablet    VALTREX    21 tablet    Take 1 tablet (1,000 mg) by mouth 3 times daily    Rash and nonspecific skin eruption

## 2017-08-07 NOTE — NURSING NOTE
Chief Complaint   Patient presents with     Derm Problem       Initial /80  Pulse 59  Temp 98.6  F (37  C) (Tympanic)  Ht 5' (1.524 m)  Wt 101 lb (45.8 kg)  LMP 07/24/2017 (Approximate)  SpO2 100%  BMI 19.73 kg/m2 Estimated body mass index is 19.73 kg/(m^2) as calculated from the following:    Height as of this encounter: 5' (1.524 m).    Weight as of this encounter: 101 lb (45.8 kg).  Medication Reconciliation: complete

## 2017-08-09 LAB
HSV1 DNA SPEC QL NAA+PROBE: NEGATIVE
HSV2 DNA SPEC QL NAA+PROBE: NORMAL
SPECIMEN SOURCE: NORMAL
SPECIMEN TYPE: ABNORMAL
VARICELLA ZOSTER DNA PCR COMMENT: ABNORMAL
VZV DNA SPEC QL NAA+PROBE: ABNORMAL

## 2017-11-28 ENCOUNTER — TELEPHONE (OUTPATIENT)
Dept: LAB | Facility: CLINIC | Age: 42
End: 2017-11-28

## 2017-11-28 NOTE — TELEPHONE ENCOUNTER
Labs on 5/10/2017 have  and a letter was sent on 10/18/2017. Patient has not followed up. Routing to team to address.    [facility]

## 2018-01-07 ENCOUNTER — HEALTH MAINTENANCE LETTER (OUTPATIENT)
Age: 43
End: 2018-01-07

## 2018-02-05 ENCOUNTER — OFFICE VISIT (OUTPATIENT)
Dept: FAMILY MEDICINE | Facility: CLINIC | Age: 43
End: 2018-02-05
Payer: COMMERCIAL

## 2018-02-05 VITALS
TEMPERATURE: 98.6 F | SYSTOLIC BLOOD PRESSURE: 144 MMHG | WEIGHT: 102 LBS | BODY MASS INDEX: 20.03 KG/M2 | HEART RATE: 68 BPM | DIASTOLIC BLOOD PRESSURE: 89 MMHG | HEIGHT: 60 IN | OXYGEN SATURATION: 99 %

## 2018-02-05 DIAGNOSIS — Z01.818 PREOP GENERAL PHYSICAL EXAM: Primary | ICD-10-CM

## 2018-02-05 LAB — HGB BLD-MCNC: 12.3 G/DL (ref 11.7–15.7)

## 2018-02-05 PROCEDURE — 36415 COLL VENOUS BLD VENIPUNCTURE: CPT | Performed by: PHYSICIAN ASSISTANT

## 2018-02-05 PROCEDURE — 85018 HEMOGLOBIN: CPT | Performed by: PHYSICIAN ASSISTANT

## 2018-02-05 PROCEDURE — 99214 OFFICE O/P EST MOD 30 MIN: CPT | Performed by: PHYSICIAN ASSISTANT

## 2018-02-05 NOTE — PROGRESS NOTES
[facility]  [address] [facility] [address]  [phone]  Dept: [phone]    PRE-OP EVALUATION:  Today's date: 2018    Rome Valdovinos (: 1975) presents for pre-operative evaluation assessment as requested by Dr. Bojorquez  She requires evaluation and anesthesia risk assessment prior to undergoing surgery/procedure for treatment of   Proposed procedure: eye lid surgery     Date of Surgery/ Procedure: 2017  Time of Surgery/ Procedure: 11 am   Hospital/Surgical Facility: [facility]   Fax number for surgical facility: [phone]  Primary Physician: Capri Matt  Type of Anesthesia Anticipated: to be determined    Patient has a Health Care Directive or Living Will:  YES     1. NO - Do you have a history of heart attack, stroke, stent, bypass or surgery on an artery in the head, neck, heart or legs?  2. NO - Do you ever have any pain or discomfort in your chest?  3. NO - Do you have a history of  Heart Failure?  4. NO - Are you troubled by shortness of breath when: walking on the level, up a slight hill or at night?  5. NO - Do you currently have a cold, bronchitis or other respiratory infection?  6. NO - Do you have a cough, shortness of breath or wheezing?  7. NO - Do you sometimes get pains in the calves of your legs when you walk?  8. NO - Do you or anyone in your family have previous history of blood clots?  9. NO - Do you or does anyone in your family have a serious bleeding problem such as prolonged bleeding following surgeries or cuts?  10. NO - Have you ever had problems with anemia or been told to take iron pills?  11. NO - Have you had any abnormal blood loss such as black, tarry or bloody stools, or abnormal vaginal bleeding?  12. NO - Have you ever had a blood transfusion?  13. NO - Have you or any of your relatives ever had problems with anesthesia?  14. NO - Do you have sleep apnea, excessive snoring or daytime drowsiness?  15.  NO - Do you have any prosthetic heart valves?  16. NO - Do you have prosthetic joints?  17. NO - Is there any chance that you may be pregnant?      HPI:                                                      Brief HPI related to upcoming procedure: Rome presents to the clinic for preoperative examination prior to elective eyelid surgery.  She is feeling well today, no concerns      See problem list for active medical problems.  Problems all longstanding and stable, except as noted/documented.  See ROS for pertinent symptoms related to these conditions.                                                                                                  .    MEDICAL HISTORY:                                                      Patient Active Problem List    Diagnosis Date Noted     Nonintractable migraine, unspecified migraine type 2016     Priority: Medium     mostly menstrual migraine        Low hemoglobin 2016     Priority: Medium     CARDIOVASCULAR SCREENING; LDL GOAL LESS THAN 160 2016     Priority: Medium     Headache 12/10/2013     Priority: Medium     menstrual migraine  Problem list name updated by automated process. Provider to review       Constipation 12/10/2013     Priority: Medium      Past Medical History:   Diagnosis Date     NO ACTIVE PROBLEMS      Past Surgical History:   Procedure Laterality Date     C LIGATION,FALLOPIAN TUBE W/      with last C section     C/SECTION, LOW TRANSVERSE      x 2     Current Outpatient Prescriptions   Medication Sig Dispense Refill     SUMAtriptan (IMITREX) 50 MG tablet Take 1 tablet (50 mg) by mouth at onset of headache for migraine May repeat dose in 2 hours.  Do not exceed 200 mg in 24 hours (Patient not taking: Reported on 2018) 9 tablet 1     OTC products: None, except as noted above    No Known Allergies   Latex Allergy: NO    Social History   Substance Use Topics     Smoking status: Never Smoker     Smokeless tobacco: Never Used      Alcohol use 0.0 oz/week     0 Standard drinks or equivalent per week      Comment: little bit of beer sometimes     History   Drug Use No       REVIEW OF SYSTEMS:                                                    C: NEGATIVE for fever, chills, change in weight  E/M: NEGATIVE for ear, mouth and throat problems  R: NEGATIVE for significant cough or SOB  CV: NEGATIVE for chest pain, palpitations or peripheral edema    EXAM:                                                    Ht 5' (1.524 m)  Wt 102 lb (46.3 kg)  LMP 01/28/2018  BMI 19.92 kg/m2    GENERAL APPEARANCE: healthy, alert and no distress     EYES: EOMI, PERRL     HENT: ear canals and TM's normal and nose and mouth without ulcers or lesions     NECK: no adenopathy, no asymmetry, masses, or scars and thyroid normal to palpation     RESP: lungs clear to auscultation - no rales, rhonchi or wheezes     CV: regular rates and rhythm, normal S1 S2, no S3 or S4 and no murmur, click or rub     ABDOMEN:  soft, nontender, no HSM or masses and bowel sounds normal     MS: extremities normal- no gross deformities noted, no evidence of inflammation in joints, FROM in all extremities.     SKIN: no suspicious lesions or rashes     NEURO: Normal strength and tone, sensory exam grossly normal, mentation intact and speech normal     PSYCH: mentation appears normal. and affect normal/bright    DIAGNOSTICS:                                                        Recent Labs   Lab Test  05/10/17   1652  04/18/16   1833   HGB  12.4  11.5*   PLT  208  262   NA   --   141   POTASSIUM   --   3.7   CR   --   0.53      Results for orders placed or performed in visit on 02/05/18   Hemoglobin   Result Value Ref Range    Hemoglobin 12.3 11.7 - 15.7 g/dL     IMPRESSION:                                                    Reason for surgery/procedure: eyelid surgery  Diagnosis/reason for consult: preoperative examination    The proposed surgical procedure is considered LOW risk.    REVISED  CARDIAC RISK INDEX  The patient has the following serious cardiovascular risks for perioperative complications such as (MI, PE, VFib and 3  AV Block):  No serious cardiac risks  INTERPRETATION: 0 risks: Class I (very low risk - 0.4% complication rate)    The patient has the following additional risks for perioperative complications:  No identified additional risks    1. Preop general physical exam  - Hemoglobin      RECOMMENDATIONS:                                                        APPROVAL GIVEN to proceed with proposed procedure, without further diagnostic evaluation       Signed Electronically by: Rahul Quintanilla PA-C    Copy of this evaluation report is provided to requesting physician.    [facility] Preop Guidelines    Addendum:  I have reviewed the above document, agree with the assessment and plan as outlined.  Optimized for the planned procedure  Xander Johnson MD  2/6/2018

## 2018-02-05 NOTE — MR AVS SNAPSHOT
After Visit Summary   2/5/2018    Rome Valdovinos    MRN: [id number]           Patient Information     Date Of Birth          1975        Visit Information        Provider Department      2/5/2018 4:00 PM Rahul Quintanilla PA-C [facility] [place] [place]        Today's Diagnoses     Preop general physical exam    -  1      Care Instructions      Before Your Surgery      Call your surgeon if there is any change in your health. This includes signs of a cold or flu (such as a sore throat, runny nose, cough, rash or fever).    Do not smoke, drink alcohol or take over the counter medicine (unless your surgeon or primary care doctor tells you to) for the 24 hours before and after surgery.    If you take prescribed drugs: Follow your doctor s orders about which medicines to take and which to stop until after surgery.    Eating and drinking prior to surgery: follow the instructions from your surgeon    Take a shower or bath the night before surgery. Use the soap your surgeon gave you to gently clean your skin. If you do not have soap from your surgeon, use your regular soap. Do not shave or scrub the surgery site.  Wear clean pajamas and have clean sheets on your bed.           Follow-ups after your visit        Follow-up notes from your care team     Return if symptoms worsen or fail to improve.      Who to contact     If you have questions or need follow up information about today's clinic visit or your schedule please contact [facility] directly at [phone].  Normal or non-critical lab and imaging results will be communicated to you by MyChart, letter or phone within 4 business days after the clinic has received the results. If you do not hear from us within 7 days, please contact the clinic through MyChart or phone. If you have a critical or abnormal lab result, we will notify you by phone as soon as possible.  Submit refill requests through [employer] or call your pharmacy  and they will forward the refill request to us. Please allow 3 business days for your refill to be completed.          Additional Information About Your Visit        [employer] Information     [employer] gives you secure access to your electronic health record. If you see a primary care provider, you can also send messages to your care team and make appointments. If you have questions, please call your primary care clinic.  If you do not have a primary care provider, please call [phone] and they will assist you.        Care EveryWhere ID     This is your Care EveryWhere ID. This could be used by other organizations to access your [facility] medical records  [id number]        Your Vitals Were     Pulse Temperature Height Last Period Pulse Oximetry BMI (Body Mass Index)    68 98.6  F (37  C) (Oral) 5' (1.524 m) 01/28/2018 99% 19.92 kg/m2       Blood Pressure from Last 3 Encounters:   02/05/18 144/89   08/07/17 123/80   05/10/17 108/74    Weight from Last 3 Encounters:   02/05/18 102 lb (46.3 kg)   08/07/17 101 lb (45.8 kg)   05/10/17 101 lb (45.8 kg)              We Performed the Following     Hemoglobin        Primary Care Provider Office Phone # Fax #    Capri Matt [phone]       [address]  [place] [address]        Equal Access to Services     [facility]: Hadii kyle bravoo Soisabel, waaxda luqadaha, qaybta kaalmaradha singh, romel mratinez . So [facility] [phone].    ATENCIÓN: Si habla español, tiene a vyas disposición servicios gratuitos de asistencia lingüística. Llbecki al [phone].    We comply with applicable federal civil rights laws and [place] laws. We do not discriminate on the basis of race, color, national origin, age, disability, sex, sexual orientation, or gender identity.            Thank you!     Thank you for choosing [facility] [place]  for your care. Our goal is always to provide you with excellent care.  Hearing back from our patients is one way we can continue to improve our services. Please take a few minutes to complete the written survey that you may receive in the mail after your visit with us. Thank you!             Your Updated Medication List - Protect others around you: Learn how to safely use, store and throw away your medicines at www.disposemymeds.org.          This list is accurate as of 2/5/18  4:37 PM.  Always use your most recent med list.                   Brand Name Dispense Instructions for use Diagnosis    SUMAtriptan 50 MG tablet    IMITREX    9 tablet    Take 1 tablet (50 mg) by mouth at onset of headache for migraine May repeat dose in 2 hours.  Do not exceed 200 mg in 24 hours    Routine general medical examination at a health care facility

## 2018-02-06 NOTE — PROGRESS NOTES
Rome-  Here are your recent results.       -Hemoglobin is normal.  There is no evidence of anemia.    If you have any questions please do not hesitate to contact our office via phone ([phone]) or you may send me a message via [employer] by clicking the contact my Care Team link.      It was a pleasure participating in your care!    Thank you,    Rahul Quintanilla MPH, PA-C  [address] [place] [address] [phone]

## 2018-04-17 ENCOUNTER — TELEPHONE (OUTPATIENT)
Dept: MAMMOGRAPHY | Facility: CLINIC | Age: 43
End: 2018-04-17

## 2018-04-17 NOTE — TELEPHONE ENCOUNTER
4/17/2018    Attempt 1    Contacted patient in regards to scheduling VIP mammogram  Message on voicemail     Comments:       Outreach   [place]

## 2020-02-16 ENCOUNTER — HEALTH MAINTENANCE LETTER (OUTPATIENT)
Age: 45
End: 2020-02-16

## 2020-11-22 ENCOUNTER — HEALTH MAINTENANCE LETTER (OUTPATIENT)
Age: 45
End: 2020-11-22

## 2021-02-13 ENCOUNTER — HEALTH MAINTENANCE LETTER (OUTPATIENT)
Age: 46
End: 2021-02-13

## 2021-04-04 ENCOUNTER — HEALTH MAINTENANCE LETTER (OUTPATIENT)
Age: 46
End: 2021-04-04

## 2021-09-19 ENCOUNTER — HEALTH MAINTENANCE LETTER (OUTPATIENT)
Age: 46
End: 2021-09-19

## 2022-03-06 ENCOUNTER — HEALTH MAINTENANCE LETTER (OUTPATIENT)
Age: 47
End: 2022-03-06

## 2022-05-01 ENCOUNTER — HEALTH MAINTENANCE LETTER (OUTPATIENT)
Age: 47
End: 2022-05-01

## 2022-11-20 ENCOUNTER — HEALTH MAINTENANCE LETTER (OUTPATIENT)
Age: 47
End: 2022-11-20

## 2023-04-15 ENCOUNTER — HEALTH MAINTENANCE LETTER (OUTPATIENT)
Age: 48
End: 2023-04-15

## 2023-06-02 ENCOUNTER — HEALTH MAINTENANCE LETTER (OUTPATIENT)
Age: 48
End: 2023-06-02

## 2023-10-15 ENCOUNTER — TRANSFERRED RECORDS (OUTPATIENT)
Dept: HEALTH INFORMATION MANAGEMENT | Facility: CLINIC | Age: 48
End: 2023-10-15
Payer: COMMERCIAL